# Patient Record
Sex: MALE | URBAN - METROPOLITAN AREA
[De-identification: names, ages, dates, MRNs, and addresses within clinical notes are randomized per-mention and may not be internally consistent; named-entity substitution may affect disease eponyms.]

---

## 2018-11-01 NOTE — PATIENT DISCUSSION
rec carotid doppler today with PCP and poss MRI. No sign of any swelling of ONH or plaque but disc need to rule out vascular causes.

## 2019-02-05 ENCOUNTER — CONSULT (OUTPATIENT)
Dept: URBAN - METROPOLITAN AREA CLINIC 12 | Facility: CLINIC | Age: 69
Setting detail: DERMATOLOGY
End: 2019-02-05

## 2019-02-05 DIAGNOSIS — B07.8 OTHER VIRAL WARTS: ICD-10-CM

## 2019-02-05 PROCEDURE — 11426 EXC H-F-NK-SP B9+MARG >4 CM: CPT

## 2019-02-05 PROCEDURE — 13121 CMPLX RPR S/A/L 2.6-7.5 CM: CPT

## 2019-02-05 PROCEDURE — 99212 OFFICE O/P EST SF 10 MIN: CPT

## 2022-03-21 ENCOUNTER — NEW PATIENT (OUTPATIENT)
Dept: URBAN - METROPOLITAN AREA CLINIC 14 | Facility: CLINIC | Age: 72
End: 2022-03-21

## 2022-03-21 DIAGNOSIS — H25.13: ICD-10-CM

## 2022-03-21 PROCEDURE — 92136 OPHTHALMIC BIOMETRY: CPT

## 2022-03-21 PROCEDURE — 99204 OFFICE O/P NEW MOD 45 MIN: CPT

## 2022-03-21 ASSESSMENT — KERATOMETRY
OS_AXISANGLE_DEGREES: 149
OS_AXISANGLE2_DEGREES: 59
OS_K2POWER_DIOPTERS: 38.75
OD_K2POWER_DIOPTERS: 38.25
OS_K1POWER_DIOPTERS: 38.00
OD_K1POWER_DIOPTERS: 38.00
OD_AXISANGLE_DEGREES: 140
OD_AXISANGLE2_DEGREES: 50

## 2022-03-21 ASSESSMENT — TONOMETRY
OS_IOP_MMHG: 12
OD_IOP_MMHG: 13

## 2022-03-21 ASSESSMENT — VISUAL ACUITY
OD_SC: J5
OS_BCVA: 20/40
OD_BCVA: 20/30
OS_SC: 20/60
OS_PH: 20/40

## 2022-03-21 NOTE — PATIENT DISCUSSION
Discussed options to be less dependent on glasses. Succesful history of monovision with lasik using the right eye for near and left eye for distance. He is s/p lasik. Discussed the benefit of PADMINI vs. distance only with a monofocal IOL (and he will wear glasses for all near and intermediate activities) vs. monovision with a monofocal iol. Pt is considering distance only and wearing glasses for near. Discuss at follow up.

## 2022-03-21 NOTE — PATIENT DISCUSSION
Visually significant. Schedule cataract surgery OS then OD if symptoms persist. Pt had a contact lens in the right eye today and will return for repeat biometry and [place] topography prior to cataract surgery.

## 2022-04-04 ENCOUNTER — POST-OP (OUTPATIENT)
Dept: URBAN - METROPOLITAN AREA CLINIC 14 | Facility: CLINIC | Age: 72
End: 2022-04-04

## 2022-04-04 DIAGNOSIS — H25.13: ICD-10-CM

## 2022-04-04 DIAGNOSIS — Z98.890: ICD-10-CM

## 2022-04-04 DIAGNOSIS — H52.7: ICD-10-CM

## 2022-04-04 PROCEDURE — 99211NC NO CHARGE VISIT

## 2022-04-14 ENCOUNTER — POST OP/EVAL OF SECOND EYE (OUTPATIENT)
Dept: URBAN - METROPOLITAN AREA CLINIC 14 | Facility: CLINIC | Age: 72
End: 2022-04-14

## 2022-04-14 DIAGNOSIS — H25.12: ICD-10-CM

## 2022-04-14 DIAGNOSIS — Z96.1: ICD-10-CM

## 2022-04-14 PROCEDURE — 99024 POSTOP FOLLOW-UP VISIT: CPT

## 2022-04-14 PROCEDURE — 92136 OPHTHALMIC BIOMETRY: CPT

## 2022-04-14 ASSESSMENT — VISUAL ACUITY
OS_BCVA: 20/40
OD_SC: 20/40

## 2022-04-14 ASSESSMENT — KERATOMETRY
OS_K1POWER_DIOPTERS: 38.00
OS_AXISANGLE_DEGREES: 149
OD_AXISANGLE2_DEGREES: 50
OD_K1POWER_DIOPTERS: 38.00
OS_K2POWER_DIOPTERS: 38.75
OD_AXISANGLE_DEGREES: 140
OS_AXISANGLE2_DEGREES: 59
OD_K2POWER_DIOPTERS: 38.25

## 2022-04-14 ASSESSMENT — TONOMETRY
OS_IOP_MMHG: 11
OD_IOP_MMHG: 16

## 2022-04-20 ENCOUNTER — POST-OP (OUTPATIENT)
Dept: URBAN - METROPOLITAN AREA CLINIC 14 | Facility: CLINIC | Age: 72
End: 2022-04-20

## 2022-04-20 DIAGNOSIS — H25.12: ICD-10-CM

## 2022-04-20 DIAGNOSIS — Z98.890: ICD-10-CM

## 2022-04-20 DIAGNOSIS — Z96.1: ICD-10-CM

## 2022-04-20 PROCEDURE — 99024 POSTOP FOLLOW-UP VISIT: CPT

## 2022-04-20 ASSESSMENT — KERATOMETRY
OS_AXISANGLE2_DEGREES: 59
OS_K1POWER_DIOPTERS: 38.00
OS_K2POWER_DIOPTERS: 38.75
OD_AXISANGLE2_DEGREES: 50
OD_AXISANGLE_DEGREES: 140
OD_K2POWER_DIOPTERS: 38.25
OS_AXISANGLE_DEGREES: 149
OD_K1POWER_DIOPTERS: 38.00

## 2022-04-20 ASSESSMENT — TONOMETRY
OD_IOP_MMHG: 15
OS_IOP_MMHG: 18

## 2022-04-20 ASSESSMENT — VISUAL ACUITY
OD_SC: J2
OS_SC: 20/70
OD_SC: 20/50
OS_SC: J3

## 2022-05-04 ASSESSMENT — KERATOMETRY
OS_AXISANGLE_DEGREES: 149
OS_K2POWER_DIOPTERS: 38.75
OD_K2POWER_DIOPTERS: 38.25
OD_AXISANGLE_DEGREES: 140
OS_AXISANGLE2_DEGREES: 59
OS_K1POWER_DIOPTERS: 38.00
OD_K1POWER_DIOPTERS: 38.00
OD_AXISANGLE2_DEGREES: 50

## 2022-06-21 RX ORDER — METOPROLOL SUCCINATE 25 MG/1
TABLET, EXTENDED RELEASE ORAL
COMMUNITY
End: 2022-09-12

## 2022-06-21 RX ORDER — VALSARTAN 320 MG/1
TABLET ORAL
COMMUNITY
End: 2022-08-19

## 2022-06-21 RX ORDER — AZELASTINE HCL 205.5 UG/1
SPRAY NASAL
COMMUNITY
Start: 2022-03-08

## 2022-06-21 RX ORDER — ZOLPIDEM TARTRATE 5 MG/1
TABLET ORAL
COMMUNITY
Start: 2022-03-08 | End: 2022-10-11 | Stop reason: SDUPTHER

## 2022-06-21 RX ORDER — ATORVASTATIN CALCIUM 40 MG/1
TABLET, FILM COATED ORAL
COMMUNITY
Start: 2011-10-12 | End: 2022-09-12 | Stop reason: SDUPTHER

## 2022-06-21 RX ORDER — HYDROCHLOROTHIAZIDE 12.5 MG/1
CAPSULE, GELATIN COATED ORAL
COMMUNITY
End: 2022-09-12

## 2022-06-21 RX ORDER — FLUTICASONE PROPIONATE 50 MCG
SPRAY, SUSPENSION (ML) NASAL
COMMUNITY
Start: 2019-12-26 | End: 2022-09-12

## 2022-06-21 RX ORDER — VALACYCLOVIR HYDROCHLORIDE 1 G/1
TABLET, FILM COATED ORAL
COMMUNITY
Start: 2021-09-07

## 2022-06-29 PROBLEM — I10 ESSENTIAL HYPERTENSION: Status: ACTIVE | Noted: 2022-06-29

## 2022-06-29 PROBLEM — H91.90 HEARING LOSS: Status: ACTIVE | Noted: 2022-06-29

## 2022-06-29 PROBLEM — R97.20 ELEVATED PSA: Status: ACTIVE | Noted: 2022-06-29

## 2022-06-29 PROBLEM — Q61.3 POLYCYSTIC KIDNEY DISEASE: Status: ACTIVE | Noted: 2022-06-29

## 2022-06-29 PROBLEM — F51.04 CHRONIC INSOMNIA: Status: ACTIVE | Noted: 2022-06-29

## 2022-06-29 PROBLEM — N28.9 RENAL INSUFFICIENCY SYNDROME: Status: ACTIVE | Noted: 2022-06-29

## 2022-06-29 PROBLEM — R39.198 SLOW URINARY STREAM: Status: ACTIVE | Noted: 2022-06-29

## 2022-06-29 PROBLEM — E78.5 HYPERLIPIDEMIA LDL GOAL <100: Status: ACTIVE | Noted: 2022-06-29

## 2022-06-29 PROBLEM — N40.0 BENIGN PROSTATIC HYPERPLASIA WITHOUT LOWER URINARY TRACT SYMPTOMS: Status: ACTIVE | Noted: 2022-06-29

## 2022-06-29 PROBLEM — N32.0 BLADDER NECK CONTRACTURE: Status: ACTIVE | Noted: 2022-06-29

## 2022-06-29 PROBLEM — D69.6 THROMBOCYTOPENIA (HCC): Status: ACTIVE | Noted: 2022-06-29

## 2022-09-12 PROBLEM — N18.30 CHRONIC RENAL DISEASE, STAGE III (HCC): Status: ACTIVE | Noted: 2022-09-12

## 2025-07-21 ENCOUNTER — NEW PATIENT (OUTPATIENT)
Age: 75
End: 2025-07-21

## 2025-07-21 DIAGNOSIS — H26.493: ICD-10-CM

## 2025-07-21 PROCEDURE — 99204 OFFICE O/P NEW MOD 45 MIN: CPT

## 2025-07-29 ENCOUNTER — SURGERY/PROCEDURE (OUTPATIENT)
Age: 75
End: 2025-07-29

## 2025-07-29 DIAGNOSIS — H26.493: ICD-10-CM

## 2025-07-29 PROCEDURE — 66821 AFTER CATARACT LASER SURGERY: CPT

## 2025-07-30 ENCOUNTER — SURGERY/PROCEDURE (OUTPATIENT)
Age: 75
End: 2025-07-30

## 2025-07-30 DIAGNOSIS — H26.493: ICD-10-CM

## 2025-07-30 PROCEDURE — 66821 AFTER CATARACT LASER SURGERY: CPT | Mod: 79,LT

## 2025-08-25 ENCOUNTER — POST-OP (OUTPATIENT)
Age: 75
End: 2025-08-25

## 2025-08-25 DIAGNOSIS — Z98.890: ICD-10-CM

## 2025-08-25 PROCEDURE — 99024 POSTOP FOLLOW-UP VISIT: CPT
